# Patient Record
Sex: FEMALE | Race: WHITE | URBAN - METROPOLITAN AREA
[De-identification: names, ages, dates, MRNs, and addresses within clinical notes are randomized per-mention and may not be internally consistent; named-entity substitution may affect disease eponyms.]

---

## 2020-08-14 ENCOUNTER — OFFICE VISIT (OUTPATIENT)
Dept: URGENT CARE | Age: 40
End: 2020-08-14

## 2020-08-14 VITALS — OXYGEN SATURATION: 97 % | TEMPERATURE: 98.1 F | HEART RATE: 66 BPM | RESPIRATION RATE: 14 BRPM

## 2020-08-14 DIAGNOSIS — Z11.59 SCREENING FOR VIRAL DISEASE: Primary | ICD-10-CM

## 2020-08-14 PROCEDURE — 99212 OFFICE O/P EST SF 10 MIN: CPT | Performed by: FAMILY MEDICINE

## 2020-08-14 NOTE — PROGRESS NOTES
2020    Jurgen Maxwell (:  1980) is a 38 y.o.  female, here requesting COVID-19 testing    History of Present Illness  screen for travel to [place]    Visit Vitals  Pulse 66   Temp 98.1 °F (36.7 °C)   Resp 14   SpO2 97%       ASSESSMENT  Screening for COVID-19/ Viral disease    PLAN  POCT influenza testing - Not Tested  COVID-19 sample collected and submitted. Patient given detailed CDC instructions contained within After Visit Summary. An  electronic signature was used to authenticate this note.     --Andrea Montero MD

## 2020-08-16 LAB — SARS-COV-2, NAA: NOT DETECTED
